# Patient Record
Sex: FEMALE | Race: WHITE | NOT HISPANIC OR LATINO | Employment: FULL TIME | URBAN - METROPOLITAN AREA
[De-identification: names, ages, dates, MRNs, and addresses within clinical notes are randomized per-mention and may not be internally consistent; named-entity substitution may affect disease eponyms.]

---

## 2019-09-02 ENCOUNTER — OFFICE VISIT (OUTPATIENT)
Dept: URGENT CARE | Facility: CLINIC | Age: 29
End: 2019-09-02
Payer: OTHER GOVERNMENT

## 2019-09-02 VITALS
BODY MASS INDEX: 19.91 KG/M2 | HEART RATE: 95 BPM | HEIGHT: 65 IN | SYSTOLIC BLOOD PRESSURE: 132 MMHG | OXYGEN SATURATION: 95 % | TEMPERATURE: 99.2 F | WEIGHT: 119.5 LBS | RESPIRATION RATE: 14 BRPM | DIASTOLIC BLOOD PRESSURE: 82 MMHG

## 2019-09-02 DIAGNOSIS — L29.9 ITCHY SKIN: ICD-10-CM

## 2019-09-02 DIAGNOSIS — L53.9 REDNESS: ICD-10-CM

## 2019-09-02 DIAGNOSIS — R21 RASH: ICD-10-CM

## 2019-09-02 PROCEDURE — 99204 OFFICE O/P NEW MOD 45 MIN: CPT | Performed by: PHYSICIAN ASSISTANT

## 2019-09-02 RX ORDER — NORETHINDRONE AND ETHINYL ESTRADIOL 0.4-0.035
KIT ORAL
COMMUNITY
Start: 2019-07-15

## 2019-09-02 RX ORDER — ACYCLOVIR 800 MG/1
TABLET ORAL
Qty: 35 TAB | Refills: 0 | Status: SHIPPED | OUTPATIENT
Start: 2019-09-02

## 2019-09-02 SDOH — HEALTH STABILITY: MENTAL HEALTH: HOW OFTEN DO YOU HAVE A DRINK CONTAINING ALCOHOL?: NEVER

## 2019-09-02 ASSESSMENT — ENCOUNTER SYMPTOMS
RHINORRHEA: 0
FATIGUE: 0
NAIL CHANGES: 0
ANOREXIA: 0
FEVER: 0
COUGH: 0
SORE THROAT: 0
EYE PAIN: 0
DIZZINESS: 0
SHORTNESS OF BREATH: 0
DIARRHEA: 0

## 2019-09-03 NOTE — PROGRESS NOTES
Subjective:      Darlene Ventura is a 28 y.o. female who presents with Itching (LT UQ of face feels like its buring x today )            Rash   This is a new problem. The current episode started today. The problem is unchanged. Location: left forehead. The rash is characterized by itchiness. She was exposed to nothing. Pertinent negatives include no anorexia, congestion, cough, diarrhea, eye pain, facial edema, fatigue, fever, joint pain, nail changes, rhinorrhea, shortness of breath or sore throat. Past treatments include nothing. The treatment provided no relief. There is no history of allergies, asthma, eczema or varicella.   Patient reports being concerned for shingles.  She states her left forehead is itchy but also feels slightly tingly.  Denies any burning sensation.  Denies vesicular rash.  Denies fever, chills, nauseous vomiting.  Denies vision changes, blurry vision, double vision, redness or discharge from her eye    Past medical history: Is not pertinent to this examination  Past surgical history: Not pertinent to this examination  Family history: Is not pertinent to this examination  Allergies: No known drug allergies  Social history: Is reviewed in [facility]  Current Outpatient Medications on File Prior to Visit   Medication Sig Dispense Refill   • metFORMIN (GLUCOPHAGE) 500 MG Tab      • BALZIVA 0.4-35 MG-MCG per tablet        No current facility-administered medications on file prior to visit.      Patient has no known allergies.      Review of Systems   Constitutional: Negative for fatigue and fever.   HENT: Negative for congestion, rhinorrhea and sore throat.    Eyes: Negative for pain.   Respiratory: Negative for cough and shortness of breath.    Cardiovascular: Negative for chest pain.   Gastrointestinal: Negative for anorexia and diarrhea.   Genitourinary: Negative.    Musculoskeletal: Negative for joint pain.   Skin: Positive for itching and rash. Negative for nail changes.   Neurological: Negative  "for dizziness.   All other systems reviewed and are negative.         Objective:     /82   Pulse 95   Temp 37.3 °C (99.2 °F)   Resp 14   Ht 1.651 m (5' 5\")   Wt 54.2 kg (119 lb 8 oz)   SpO2 95%   BMI 19.89 kg/m²      Physical Exam   HENT:   Head:       Patient reports redness and irritation in the left forehead region.  I do not elicit any redness, swelling, vesicular rash, painful lesions or ulcerations          Gen.: Patient is A and O ×3, no acute distress, well-nourished well-hydrated  Vitals: Are listed and unremarkable  HEENT: Heads normocephalic, atraumatic, PERRLA, extraocular movements intact, TMs and oropharynx clear  Neck: Soft supple without cervical lymphadenopathy  Cardiovascular: Regular rate and rhythm normal S1 and S2. No murmurs, rubs or gallops  Lungs are clear to auscultation bilaterally. no wheezes rales or rhonchi  Abdomen is soft, nontender, nondistended with good bowel sounds, no hepatosplenomegaly  Skin: Is well perfused without evidence of rash or lesions  Neurological:  cranial nerves II through XII were assessed and intact.  Musculoskeletal: Full range of motion, 5 out of 5 strength against resistance  Neurovascularly: Intact with a 2 second cap refill, good distal pulses     Assessment/Plan:     1. Itchy skin  Use anti-itch cream, use emollients, take Claritin and only fill prescription if you develop a vesicular rash  - acyclovir (ZOVIRAX) 800 MG Tab; Take one pill 5 times a day for 7 days.  Start as soon as possible within 72 hours of rash onset  Dispense: 35 Tab; Refill: 0    2. Redness  Use anti-itch cream, using emollients, take Claritin daily and only fill prescription if you develop a vesicular rash  - acyclovir (ZOVIRAX) 800 MG Tab; Take one pill 5 times a day for 7 days.  Start as soon as possible within 72 hours of rash onset  Dispense: 35 Tab; Refill: 0    3.  Patient reporting history of rash  I do not see any rash currently.  Discussed with patient to use " anti-itch cream, increase fluids, use emollients, take Claritin and only fill prescription if she develops a vesicular rash.  Please also follow-up if she develops vesicular rash  - acyclovir (ZOVIRAX) 800 MG Tab; Take one pill 5 times a day for 7 days.  Start as soon as possible within 72 hours of rash onset  Dispense: 35 Tab; Refill: 0

## 2021-06-23 ENCOUNTER — NON-PROVIDER VISIT (OUTPATIENT)
Dept: HEALTH INFORMATION MANAGEMENT | Facility: MEDICAL CENTER | Age: 31
End: 2021-06-23
Payer: OTHER GOVERNMENT

## 2021-06-23 VITALS — BODY MASS INDEX: 23.71 KG/M2 | WEIGHT: 142.3 LBS | HEIGHT: 65 IN

## 2021-06-23 DIAGNOSIS — O24.419 GESTATIONAL DIABETES MELLITUS (GDM) IN THIRD TRIMESTER, GESTATIONAL DIABETES METHOD OF CONTROL UNSPECIFIED: ICD-10-CM

## 2021-06-23 PROCEDURE — G0109 DIAB MANAGE TRN IND/GROUP: HCPCS | Performed by: INTERNAL MEDICINE

## 2021-06-23 NOTE — PROGRESS NOTES
elvis came to the Gestational Diabetes program. She denies any family history of diabetes.  She has PCOS and is currently taking Metformin 500 mg BID.  She brought in a Relion meter.  She was able to do a return demonstration without difficulty. She will keep walking and stay well hydrated with water. Her meal plan is scanned into Media and her Doctor Letters with what was taught can be found under the Letters tab.

## 2021-06-23 NOTE — PROGRESS NOTES
The pt received a 2000 calorie meal plan (based on 30 kcal/kg of current weight) consisting of 3 meals and 3 snacks (see media for copy of meal plan).   Pt's prepregnancy weight was: 115lb. She has gained 27.3lb so far in this pregnancy.   Recommended meal times:   B: 5:30  S: 9:30  L: 12:30  S: 3:30  D: 6:30  S: 8:30    Pt was educated on carbohydrate containing foods vs non carbohydrate containing foods, the importance of small frequent meals, limiting carbohydrate to 1 serving in the morning, no fruit before noon/12pm, and avoiding all concentrated sweets for the remainder of her pregnancy. Explained the importance of not going >4hrs btwn eating during the day and no longer than 10hours overnight. Patient agrees to follow the meal plan and guidelines provided.

## 2021-06-23 NOTE — LETTER
June 23, 2021                   Re: Darlene Ventura     1990         [id number]       Skye Luong M.D.  [address] [place] [address]      Dear :Skye Luong M.D.    On 6/23/2021, your patient Darlene Ventura, received 1 hour of nurse training from the [facility] at [facility] for management of her gestational diabetes.  Her Estimated Date of Delivery: 8/31/21.  We taught the following subjects:    Introduction to gestational diabetes, benefits and responsibilities of patient, physiology of diabetes and the diease process, benefits of blood glucose monitoring and record keeping, medication action and possible side effects, hypoglycemia, sick day management, exercise, stress reduction and travel with diabetes.       Nurse assessment / Education:    Comments:        Weight:Weight: 64.5 kg (142 lb 4.8 oz)         Complaints:yes - PCOS and taking Metformin 500 mg BID      Pathophysiology of diabetes in pregnancy    Discuss  potential maternal and fetal complications in pregnancy with diabetes.     Importance of blood glucose monitoring   Proper testing technique using a Relion meter.    At 2:03 pm, the meter read 74, which was 2 hours after eating.  Testing: fasting and one hour after meals,  expected ranges and rationale for strict control.     Ketone test today:no       Recognition and treatment of hypoglycemia.     Insulin taught: No  Insulin briefly dicussed at this time.    Should patient require insulin later in pregnancy, she would need further education.     Reviewed fetal kick counts and other tests to determine fetal well-being  Discuss benefits and risks of exercise in pregnancy  Discuss when to call Doctor  Discuss sick day care  Importance of wearing diabetes identification      Patient/caregiver appeared to understand the content as demonstrated by appropriate questions.     Darlene Ventura was encouraged to discuss this further with you.    Hopefully this will help in your  management of her care.  If we can be of further assistance, please feel free to call.    Thank you for the referral.    Sincerely,      Peyton Rosas RN CDE  [facility]  Certified Diabetes Educator

## 2021-06-23 NOTE — LETTER
June 23, 2021                   Re: Darlene Ventura     1990         [id number]       Skye Luong M.D.  [address] [place] [address]      Dear :Skye Luong M.D.    On 6/23/2021, your patient Darlene Ventura, received 1 hours of nutrition training from the [facility] Center at [facility] for management of her gestational diabetes.  Her EDC is Estimated Date of Delivery: None noted. We taught the following subjects:    Patient was provided with a 2000 calorie meal plan with 3 meals and 3 snacks.  Meal plan contains 195 grams of carbohydrates per day.   Importance of meal planning in diabetes management during pregnancy  Importance of consistent timing of meals and snacks and agreed upon times  Avoidance of simple carbohydrates  Metabolism of food components relating to pregnancy  Identification of foods in food groups  Patient demonstrates adequate ability to utilize meal planning manual for reference  Plan 3 meals and 3 snacks with 90% accuracy  Review basic principles of eating out  Reviewed precautions with artificial sweeteners    Comments: Darlene agreed to follow the meal plan and to eat at the times agreed upon.  She will check blood glucose 4 times a day and record the values in her log book.      Hopefully this will help in your management of her care.  If we can be of further assistance, please feel free to call.    Thank you for the referral.    Sincerely,    Livia Bobo, MS, RD, LD  [facility]  [phone]

## 2024-11-04 ENCOUNTER — HOSPITAL ENCOUNTER (OUTPATIENT)
Dept: RADIOLOGY | Facility: MEDICAL CENTER | Age: 34
End: 2024-11-04
Attending: REGISTERED NURSE
Payer: COMMERCIAL

## 2024-11-04 DIAGNOSIS — O26.851 SPOTTING IN FIRST TRIMESTER: ICD-10-CM

## 2024-11-04 PROCEDURE — 76817 TRANSVAGINAL US OBSTETRIC: CPT
